# Patient Record
(demographics unavailable — no encounter records)

---

## 2024-11-18 NOTE — PHYSICAL EXAM
[No Acute Distress] : no acute distress [Normal Appearance] : normal appearance [No Neck Mass] : no neck mass [Normal Rate/Rhythm] : normal rate/rhythm [Normal S1, S2] : normal s1, s2 [No Murmurs] : no murmurs [No Resp Distress] : no resp distress [No Acc Muscle Use] : no acc muscle use [Clear to Auscultation Bilaterally] : clear to auscultation bilaterally [No Clubbing] : no clubbing [No Cyanosis] : no cyanosis [No Edema] : no edema [Oriented x3] : oriented x3 [Normal Affect] : normal affect

## 2024-11-18 NOTE — DISCUSSION/SUMMARY
[FreeTextEntry1] : 71F with previous smoking history here for evaluation of shortness of breath 2/2 COPD  #Shortness of breath #COPD #paroxysmal nocturnal dyspnea  - Will repeat PFTs/6MWT now to evaluate for any changes - Rx for levalbuterol sent as rescue inhaler in hopes it will minimze palpitations - She mentioned a previous cardiologist had prescribed her low dose clonidine which helped her palpitations. I encouraged her to re-establish care as she may need further work-up - Flu shot up to date

## 2024-11-18 NOTE — HISTORY OF PRESENT ILLNESS
[Former] : former [< 20 pack-years] : < 20 pack-years [Never] : never [TextBox_4] :  Ms. JENNIFER GUEVARA is a 70 year old F here for evaluation of shortness of breath.  She notes a longstanding history of shortness of breath since about 15 yrs ago. She was evaluated by pulmonary at that time and recalls getting testing which was normal. She notes that for the past 15 yrs she cannot say that her shortness of breath has gotten significantly worse, and is likely about the same. Overall it's not bothersome and she is able to perform daily activities without issue but she does note that after climbing 2 flights of stairs, she breathes heavily and has to rest. She is able to recover her breath after resting. Denies associated symptoms of chest pain, n/v, cough, hemoptysis, syncope. She is concerned about her overall lung health as she is a former smoker. She previously smoked about 0.5ppd x20 yrs, quit >30 yrs ago.  Spirometry 2014 FVC 2.91 (97%) FEV1 2.14 (92%)  6/2024 Ms. GUEVARA returns today for follow-up. Doing well since last visit, though a few weeks ago she got COVID but had mild symptoms. She is now inquiring about a PRN inhaler to use in the event of future viral illness. She is not ready to take daily inhaler.  8/2024 Ms. GUEVARA returns today for follow-up recent PSG and symptoms. Doing well and notes that pulmonary rehab is really helping her. She is going 3 times per week. She notes that her sleep issues primarily occur around 630am. She notes palpitations when she wakes and has made notes of her HR and BP during these times. She notes that her BP usually rises up to 130-140 and HR goes up to 80s which is not abnormal but is a significant increase from her resting BP and HR. She has kept a log of her values over time.  11/2024 Ms. GUEVARA returns today for follow-up. Doing well and feels great after completing Pulmonary rehab. She has not yet started taking the spiriva but is inquiring about a rescue inhaler other than albuterol due to her concern for her nightly palpitaitons. [TextBox_11] : 0.5 [TextBox_13] : 20 [YearQuit] : 1994

## 2025-05-29 NOTE — DISCUSSION/SUMMARY
[FreeTextEntry1] : 72F with previous smoking history here for fllow up of shortness of breath 2/2 COPD  #Shortness of breath #COPD #paroxysmal nocturnal dyspnea  - Repeat PFTs after her Pulm rehab course shows overall stability. Clinically she feels well and does not have need for frequent rescue inhalers - I again discussed different inhaler meds for COPD management at length. We discussed risks/benefits of each medication contained in Trelegy vs using spiriva alone. Given her concern for palpitations, recommend to avoid LABA. Given her PFTs show minimal/no airways obstruction and normal DLCO I am hesitant to given her standing ICS as it can increase risk for pneumonias. I suggested that LAMA alone is adequate for mild COPD management and helps preserve lung function. After further discussion she is willing to try Spiriva; new Rx sent - I reassured that either clonidine or metoprolol would not significantly impact her respiratory drive - Re-reviewed her prior sleep study and there were no significant desaturations or findings to suggest hypoxemia plays a role in her palpitations

## 2025-05-29 NOTE — HISTORY OF PRESENT ILLNESS
[Former] : former [< 20 pack-years] : < 20 pack-years [Never] : never [TextBox_4] :  Ms. JENNIFER GUEVARA is a 70 year old F here for evaluation of shortness of breath.  She notes a longstanding history of shortness of breath since about 15 yrs ago. She was evaluated by pulmonary at that time and recalls getting testing which was normal. She notes that for the past 15 yrs she cannot say that her shortness of breath has gotten significantly worse, and is likely about the same. Overall it's not bothersome and she is able to perform daily activities without issue but she does note that after climbing 2 flights of stairs, she breathes heavily and has to rest. She is able to recover her breath after resting. Denies associated symptoms of chest pain, n/v, cough, hemoptysis, syncope. She is concerned about her overall lung health as she is a former smoker. She previously smoked about 0.5ppd x20 yrs, quit >30 yrs ago.  Spirometry 2014 FVC 2.91 (97%) FEV1 2.14 (92%)  6/2024 Ms. GUEVARA returns today for follow-up. Doing well since last visit, though a few weeks ago she got COVID but had mild symptoms. She is now inquiring about a PRN inhaler to use in the event of future viral illness. She is not ready to take daily inhaler.  8/2024 Ms. GUEVARA returns today for follow-up recent PSG and symptoms. Doing well and notes that pulmonary rehab is really helping her. She is going 3 times per week. She notes that her sleep issues primarily occur around 630am. She notes palpitations when she wakes and has made notes of her HR and BP during these times. She notes that her BP usually rises up to 130-140 and HR goes up to 80s which is not abnormal but is a significant increase from her resting BP and HR. She has kept a log of her values over time.  11/2024 Ms. GUEVARA returns today for follow-up. Doing well and feels great after completing Pulmonary rehab. She has not yet started taking the spiriva but is inquiring about a rescue inhaler other than albuterol due to her concern for her nightly palpitations.  5/2025 Ms. GUEVARA returns today for follow-up. Doing well and without new respiratory symptoms. Still with early morning palpitations. She wanted to discuss inhalers and palpitations treatment today. She was given clonidine and metoprolol by her cardiologist in the past to aide in her palpitations. She is concerned about their effects on her respiratory drive. Her PCP recently also prescribed her Trelegy and she is wondering which inhaler she should take. [TextBox_11] : 0.5 [TextBox_13] : 20 [YearQuit] : 1994